# Patient Record
Sex: FEMALE | Race: WHITE | NOT HISPANIC OR LATINO | Employment: FULL TIME | URBAN - METROPOLITAN AREA
[De-identification: names, ages, dates, MRNs, and addresses within clinical notes are randomized per-mention and may not be internally consistent; named-entity substitution may affect disease eponyms.]

---

## 2018-12-18 ENCOUNTER — HISTORICAL (OUTPATIENT)
Dept: ADMINISTRATIVE | Facility: HOSPITAL | Age: 28
End: 2018-12-18

## 2018-12-18 LAB
ABS NEUT (OLG): 9.19 X10(3)/MCL (ref 2.1–9.2)
BASOPHILS # BLD AUTO: 0 X10(3)/MCL (ref 0–0.2)
BASOPHILS NFR BLD AUTO: 0 %
EOSINOPHIL # BLD AUTO: 0 X10(3)/MCL (ref 0–0.9)
EOSINOPHIL NFR BLD AUTO: 0 %
ERYTHROCYTE [DISTWIDTH] IN BLOOD BY AUTOMATED COUNT: 13.2 % (ref 11.5–17)
GLUCOSE 1H P 100 G GLC PO SERPL-MCNC: 114 MG/DL (ref 100–180)
GROUP & RH: NORMAL
HBV SURFACE AG SERPL QL IA: NEGATIVE
HCT VFR BLD AUTO: 34.8 % (ref 37–47)
HGB BLD-MCNC: 10.8 GM/DL (ref 12–16)
HIV 1+2 AB+HIV1 P24 AG SERPL QL IA: NEGATIVE
LYMPHOCYTES # BLD AUTO: 1.3 X10(3)/MCL (ref 0.6–4.6)
LYMPHOCYTES NFR BLD AUTO: 12 %
MCH RBC QN AUTO: 27.1 PG (ref 27–31)
MCHC RBC AUTO-ENTMCNC: 31 GM/DL (ref 33–36)
MCV RBC AUTO: 87.2 FL (ref 80–94)
MONOCYTES # BLD AUTO: 0.4 X10(3)/MCL (ref 0.1–1.3)
MONOCYTES NFR BLD AUTO: 3 %
NEUTROPHILS # BLD AUTO: 9.19 X10(3)/MCL (ref 2.1–9.2)
NEUTROPHILS NFR BLD AUTO: 84 %
PLATELET # BLD AUTO: 255 X10(3)/MCL (ref 130–400)
PMV BLD AUTO: 10.6 FL (ref 9.4–12.4)
RBC # BLD AUTO: 3.99 X10(6)/MCL (ref 4.2–5.4)
T PALLIDUM AB SER QL: NORMAL
WBC # SPEC AUTO: 11 X10(3)/MCL (ref 4.5–11.5)

## 2018-12-20 LAB — FINAL CULTURE: NORMAL

## 2019-02-14 ENCOUNTER — HISTORICAL (OUTPATIENT)
Dept: LAB | Facility: HOSPITAL | Age: 29
End: 2019-02-14

## 2019-02-16 LAB — FINAL CULTURE: NORMAL

## 2022-12-04 ENCOUNTER — HOSPITAL ENCOUNTER (EMERGENCY)
Facility: HOSPITAL | Age: 32
Discharge: HOME | End: 2022-12-04
Attending: EMERGENCY MEDICINE
Payer: MEDICAID

## 2022-12-04 VITALS
TEMPERATURE: 98 F | BODY MASS INDEX: 25.9 KG/M2 | HEART RATE: 75 BPM | DIASTOLIC BLOOD PRESSURE: 78 MMHG | OXYGEN SATURATION: 99 % | WEIGHT: 165 LBS | SYSTOLIC BLOOD PRESSURE: 134 MMHG | RESPIRATION RATE: 18 BRPM | HEIGHT: 67 IN

## 2022-12-04 DIAGNOSIS — S83.004A DISLOCATION OF RIGHT PATELLA, INITIAL ENCOUNTER: Primary | ICD-10-CM

## 2022-12-04 DIAGNOSIS — M25.562 LEFT KNEE PAIN: ICD-10-CM

## 2022-12-04 PROCEDURE — 99284 EMERGENCY DEPT VISIT MOD MDM: CPT | Mod: 25

## 2022-12-04 PROCEDURE — 96372 THER/PROPH/DIAG INJ SC/IM: CPT | Performed by: EMERGENCY MEDICINE

## 2022-12-04 PROCEDURE — 25000003 PHARM REV CODE 250: Performed by: EMERGENCY MEDICINE

## 2022-12-04 PROCEDURE — 63600175 PHARM REV CODE 636 W HCPCS: Performed by: EMERGENCY MEDICINE

## 2022-12-04 RX ORDER — HYDROCODONE BITARTRATE AND ACETAMINOPHEN 5; 325 MG/1; MG/1
1 TABLET ORAL
Status: COMPLETED | OUTPATIENT
Start: 2022-12-04 | End: 2022-12-04

## 2022-12-04 RX ORDER — IBUPROFEN 800 MG/1
800 TABLET ORAL EVERY 6 HOURS PRN
Qty: 20 TABLET | Refills: 0 | Status: SHIPPED | OUTPATIENT
Start: 2022-12-04 | End: 2023-02-15 | Stop reason: HOSPADM

## 2022-12-04 RX ORDER — KETOROLAC TROMETHAMINE 30 MG/ML
30 INJECTION, SOLUTION INTRAMUSCULAR; INTRAVENOUS
Status: COMPLETED | OUTPATIENT
Start: 2022-12-04 | End: 2022-12-04

## 2022-12-04 RX ORDER — HYDROCODONE BITARTRATE AND ACETAMINOPHEN 5; 325 MG/1; MG/1
1 TABLET ORAL EVERY 6 HOURS PRN
Qty: 10 TABLET | Refills: 0 | Status: SHIPPED | OUTPATIENT
Start: 2022-12-04 | End: 2023-02-15 | Stop reason: HOSPADM

## 2022-12-04 RX ADMIN — KETOROLAC TROMETHAMINE 30 MG: 30 INJECTION, SOLUTION INTRAMUSCULAR at 11:12

## 2022-12-04 RX ADMIN — HYDROCODONE BITARTRATE AND ACETAMINOPHEN 1 TABLET: 5; 325 TABLET ORAL at 11:12

## 2022-12-04 NOTE — FIRST PROVIDER EVALUATION
"Medical screening examination initiated.  I have conducted a focused provider triage encounter, findings are as follows:    Brief history of present illness:  32-year-old female presents to ED for evaluation of left knee pain.  Patient states she was dancing in [employer] when she felt her knee dislocate.  Per EMS given fentanyl and were able to relocate her knee.    Vitals:    12/04/22 1005   BP: (!) 141/84   BP Location: Left arm   Pulse: 71   Resp: 19   Temp: 97.2 °F (36.2 °C)   TempSrc: Oral   SpO2: 99%   Weight: 74.8 kg (165 lb)   Height: 5' 7" (1.702 m)       Pertinent physical exam:  Patient awake and alert lying on stretcher    Brief workup plan:  X-ray left knee    Preliminary workup initiated; this workup will be continued and followed by the physician or advanced practice provider that is assigned to the patient when roomed.  "

## 2022-12-04 NOTE — Clinical Note
"Teresa"Shar Simpson was seen and treated in our emergency department on 12/4/2022.  She may return to work on 12/12/2022.       If you have any questions or concerns, please don't hesitate to call.      Bart NAVARRETE RN    "

## 2022-12-04 NOTE — ED PROVIDER NOTES
"Encounter Date: 12/4/2022    SCRIBE #1 NOTE: I, Lesvia Stock, am scribing for, and in the presence of,  Marialuisa Childress MD. I have scribed the following portions of the note - Other sections scribed: HPI, ROS, Physical exam.     History     Chief Complaint   Patient presents with    Knee Pain     Pt reports left knee pain while dancing at [employer]. EMS reports administering 100 mcg Fentanyl IV. CMS intact to left lower extremity. Pt GCS-15.       This is a 32 y.o. female who presents with complaint of left knee pain with onset prior to arrival. Patient reports that she was dancing at [employer] when she felt her knee move to the left. She adds that she fell backwards and that was when the knee pain began. Patient notes that she could not straighten her knee until EMS "popped it back in place." Patient rates that pain a 3/10 currently. She states that the pain fluctuates.      The history is provided by the patient.   Knee Pain  This is a new problem. The current episode started 1 to 2 hours ago. Episode frequency: intermittenly. The problem has been gradually improving. Pertinent negatives include no chest pain, no abdominal pain, no headaches and no shortness of breath.   Review of patient's allergies indicates:  No Known Allergies  History reviewed. No pertinent past medical history.  History reviewed. No pertinent surgical history.  History reviewed. No pertinent family history.     Review of Systems   Constitutional:  Negative for chills, fatigue and fever.   HENT:  Negative for congestion and sore throat.    Eyes:  Negative for visual disturbance.   Respiratory:  Negative for cough and shortness of breath.    Cardiovascular:  Negative for chest pain.   Gastrointestinal:  Negative for abdominal pain, diarrhea, nausea and vomiting.   Genitourinary:  Negative for dysuria.   Musculoskeletal:  Negative for myalgias.        + Left knee pain   Skin:  Negative for rash.   Neurological:  Negative for weakness, numbness and " headaches.   All other systems reviewed and are negative.    Physical Exam     Initial Vitals [12/04/22 1005]   BP Pulse Resp Temp SpO2   (!) 141/84 71 19 97.2 °F (36.2 °C) 99 %      MAP       --         Physical Exam    Nursing note and vitals reviewed.  Constitutional: She appears well-developed and well-nourished. She is not diaphoretic. No distress.   HENT:   Head: Normocephalic and atraumatic.   Nose: Nose normal.   Mouth/Throat: Oropharynx is clear and moist.   Eyes: Conjunctivae and EOM are normal. Pupils are equal, round, and reactive to light.   Neck: Trachea normal. Neck supple.   Normal range of motion.  Cardiovascular:  Normal rate, regular rhythm, normal heart sounds and intact distal pulses.           No murmur heard.  Pulmonary/Chest: Breath sounds normal. No respiratory distress. She has no wheezes. She has no rhonchi. She has no rales. She exhibits no tenderness.   Abdominal: Abdomen is soft. Bowel sounds are normal. She exhibits no distension and no mass. There is no abdominal tenderness. There is no rebound and no guarding.   Musculoskeletal:         General: No tenderness.      Cervical back: Normal range of motion and neck supple.      Lumbar back: Normal. Normal range of motion.      Comments: Soft tissue swelling around patella, but patella is in place. No apparent effusion. Good pulses.     Neurological: She is alert and oriented to person, place, and time. She has normal strength. No cranial nerve deficit or sensory deficit.   Skin: Skin is warm and dry. Capillary refill takes less than 2 seconds. No abscess noted. No erythema. No pallor.   Psychiatric: She has a normal mood and affect. Her behavior is normal. Judgment and thought content normal.       ED Course   Procedures  Labs Reviewed - No data to display       Imaging Results              X-Ray Knee 3 View Left (Final result)  Result time 12/04/22 10:32:28      Final result by Yash Pompa MD (12/04/22 10:32:28)                    Impression:      No acute fracture.      Electronically signed by: Yash Pompa  Date:    12/04/2022  Time:    10:32               Narrative:    EXAMINATION:  XR KNEE 3 VIEW LEFT    CLINICAL HISTORY:  Pain in left knee    TECHNIQUE:  AP, lateral, and Merchant views of the left knee were performed.    COMPARISON:  None    FINDINGS:  No displaced fracture the soft tissues are grossly unremarkable.                                    X-Rays:   Independently Interpreted Readings:   Other Readings:  Knee xr without acute fracture, patella appears in place  Medications   ketorolac injection 30 mg (has no administration in time range)   HYDROcodone-acetaminophen 5-325 mg per tablet 1 tablet (has no administration in time range)     Medical Decision Making:   History:   Old Medical Records: I decided to obtain old medical records.  Old Records Summarized: records from clinic visits.       <> Summary of Records: noncontributory  Initial Assessment:   Patellar dislocation, reduced  Differential Diagnosis:   Tibial plateau fracture, incomplete reduction  Independently Interpreted Test(s):   I have ordered and independently interpreted X-rays - see prior notes.  Clinical Tests:   Radiological Study: Ordered and Reviewed  ED Management:  Knee immobilizer, pain control, ortho f/u        Scribe Attestation:   Scribe #1: I performed the above scribed service and the documentation accurately describes the services I performed. I attest to the accuracy of the note.  Comments: Attending:   Physician Attestation Statement for Scribe #1: I, Marialuisa Childress MD, personally performed the services described in this documentation. All medical record entries made by the scribe were at my direction and in my presence.  I have reviewed the chart and agree that the record reflects my personal performance and is accurate and complete.        Attending Attestation:           Physician Attestation for Scribe:  Physician Attestation Statement  for Scribe #1: I, Marialuisa Childress MD, reviewed documentation, as scribed by Lesvia Stock in my presence, and it is both accurate and complete.                        Clinical Impression:   Final diagnoses:  [M25.562] Left knee pain  [S83.004A] Dislocation of right patella, initial encounter (Primary)        ED Disposition Condition    Discharge Stable          ED Prescriptions       Medication Sig Dispense Start Date End Date Auth. Provider    HYDROcodone-acetaminophen (NORCO) 5-325 mg per tablet Take 1 tablet by mouth every 6 (six) hours as needed for Pain. 10 tablet 12/4/2022 -- Marialuisa Childress MD    ibuprofen (ADVIL,MOTRIN) 800 MG tablet Take 1 tablet (800 mg total) by mouth every 6 (six) hours as needed for Pain (take wtih food). 20 tablet 12/4/2022 -- Marialuisa Childress MD          Follow-up Information       Follow up With Specialties Details Why Contact Info    See Alvarez, DO Orthopedic Surgery Call in 1 day  [address]  [address]  [facility] [address]  [phone]      [facility] - Emergency Dept Emergency Medicine  As needed, If symptoms worsen [address] [address] [phone]             Marialuisa Childress MD  12/04/22 120

## 2022-12-06 DIAGNOSIS — M25.562 LEFT KNEE PAIN, UNSPECIFIED CHRONICITY: Primary | ICD-10-CM

## 2022-12-06 DIAGNOSIS — M25.562 LEFT KNEE PAIN: ICD-10-CM

## 2022-12-19 ENCOUNTER — HOSPITAL ENCOUNTER (OUTPATIENT)
Dept: RADIOLOGY | Facility: HOSPITAL | Age: 32
Discharge: HOME | End: 2022-12-19
Attending: EMERGENCY MEDICINE
Payer: MEDICAID

## 2022-12-19 DIAGNOSIS — M25.562 LEFT KNEE PAIN, UNSPECIFIED CHRONICITY: ICD-10-CM

## 2022-12-19 DIAGNOSIS — M25.562 LEFT KNEE PAIN: Primary | ICD-10-CM

## 2022-12-19 DIAGNOSIS — M25.562 LEFT KNEE PAIN: ICD-10-CM

## 2022-12-19 PROCEDURE — 73562 X-RAY EXAM OF KNEE 3: CPT | Mod: TC,LT

## 2022-12-19 PROCEDURE — 73565 X-RAY EXAM OF KNEES: CPT | Mod: TC

## 2023-01-18 ENCOUNTER — OFFICE VISIT (OUTPATIENT)
Dept: ORTHOPEDICS | Facility: CLINIC | Age: 33
End: 2023-01-18
Payer: MEDICAID

## 2023-01-18 ENCOUNTER — HOSPITAL ENCOUNTER (OUTPATIENT)
Dept: RADIOLOGY | Facility: HOSPITAL | Age: 33
Discharge: HOME | End: 2023-01-18
Attending: STUDENT IN AN ORGANIZED HEALTH CARE EDUCATION/TRAINING PROGRAM
Payer: MEDICAID

## 2023-01-18 VITALS
HEART RATE: 67 BPM | BODY MASS INDEX: 32.27 KG/M2 | SYSTOLIC BLOOD PRESSURE: 133 MMHG | WEIGHT: 205.63 LBS | DIASTOLIC BLOOD PRESSURE: 87 MMHG | HEIGHT: 67 IN | RESPIRATION RATE: 18 BRPM | OXYGEN SATURATION: 99 %

## 2023-01-18 DIAGNOSIS — M25.362 PATELLAR INSTABILITY OF LEFT KNEE: ICD-10-CM

## 2023-01-18 DIAGNOSIS — M25.562 ACUTE PAIN OF LEFT KNEE: Primary | ICD-10-CM

## 2023-01-18 DIAGNOSIS — M25.562 LEFT KNEE PAIN, UNSPECIFIED CHRONICITY: ICD-10-CM

## 2023-01-18 DIAGNOSIS — M25.562 ACUTE PAIN OF LEFT KNEE: ICD-10-CM

## 2023-01-18 PROCEDURE — 3079F DIAST BP 80-89 MM HG: CPT | Mod: CPTII,,, | Performed by: SPECIALIST

## 2023-01-18 PROCEDURE — 99214 OFFICE O/P EST MOD 30 MIN: CPT | Mod: PBBFAC

## 2023-01-18 PROCEDURE — 3075F SYST BP GE 130 - 139MM HG: CPT | Mod: CPTII,,, | Performed by: SPECIALIST

## 2023-01-18 PROCEDURE — 99203 PR OFFICE/OUTPT VISIT, NEW, LEVL III, 30-44 MIN: ICD-10-PCS | Mod: S$PBB,,, | Performed by: SPECIALIST

## 2023-01-18 PROCEDURE — 73564 X-RAY EXAM KNEE 4 OR MORE: CPT | Mod: TC,LT

## 2023-01-18 PROCEDURE — 3008F PR BODY MASS INDEX (BMI) DOCUMENTED: ICD-10-PCS | Mod: CPTII,,, | Performed by: SPECIALIST

## 2023-01-18 PROCEDURE — 3079F PR MOST RECENT DIASTOLIC BLOOD PRESSURE 80-89 MM HG: ICD-10-PCS | Mod: CPTII,,, | Performed by: SPECIALIST

## 2023-01-18 PROCEDURE — 1159F MED LIST DOCD IN RCRD: CPT | Mod: CPTII,,, | Performed by: SPECIALIST

## 2023-01-18 PROCEDURE — 1159F PR MEDICATION LIST DOCUMENTED IN MEDICAL RECORD: ICD-10-PCS | Mod: CPTII,,, | Performed by: SPECIALIST

## 2023-01-18 PROCEDURE — 3008F BODY MASS INDEX DOCD: CPT | Mod: CPTII,,, | Performed by: SPECIALIST

## 2023-01-18 PROCEDURE — 3075F PR MOST RECENT SYSTOLIC BLOOD PRESS GE 130-139MM HG: ICD-10-PCS | Mod: CPTII,,, | Performed by: SPECIALIST

## 2023-01-18 PROCEDURE — 99203 OFFICE O/P NEW LOW 30 MIN: CPT | Mod: S$PBB,,, | Performed by: SPECIALIST

## 2023-01-18 NOTE — PROGRESS NOTES
[facility] Orthopaedic Surgery Clinic Note    In brief, Teresa Simpson is a 32 y.o. female who sustained a left patellar dislocation while dancing in early December, she states that EMS was able to reduce the patella dislocation, and she has been able to walk in a brace since this time.  She denies previous injuries to the left or right knee.  Has never had any instability issues with either knee.  She does state that she feels she can not trust the knee, however she has not had any dislocations since her previous injury in December.  She does have mild pain over the medial femoral condyle.  She denies any paresthesias distally states that her pain occasionally does radiate into the mid quad and mid lower leg.      PMH:   Past Medical History:   Diagnosis Date    Known health problems: none        PSH:   Past Surgical History:   Procedure Laterality Date    OVARIAN CYST REMOVAL N/A        SH:   Social History     Socioeconomic History    Marital status:    Tobacco Use    Smoking status: Never    Smokeless tobacco: Never   Substance and Sexual Activity    Alcohol use: Yes    Drug use: Never       FH: History reviewed. No pertinent family history.    Allergies: Review of patient's allergies indicates:  No Known Allergies    ROS:  Negative except for above    Physical Exam:  Vitals:    01/18/23 0743   BP: 133/87   Pulse: 67   Resp: 18       General: NAD  Cardio: RRR by peripheral pulse  Pulm: Normal WOB on room air, symmetric chest rise    MSK:  Left lower extremity   Skin intact, no open wounds  Moderate effusion  Minimal tenderness to palpation over medial femoral condyle and medial proximal patella pole  Some apprehension with lateral translation of the patella  Patellar grind negative  lateral translation patella 3/4  J sign positive   Negative Lachman, negative posterior drawer  Knee stable to varus and valgus stress  Range of motion 0-130, comparable to contralateral side  Sensation intact to light  touch SA/S/SP/DP/T   Motor intact quad/hamstrings/TA/EHL/FHL  Quadriceps 4/5 strength  DP palpable, skin warm and well-perfused    Imaging:   X-rays of the left knee demonstrate patella aligned within trochlear groove, no obvious fractures of the patella, femur, tibia, medial and lateral joint spaces maintained    Assessment:  32-year-old female with first-time dislocation of the left patella, sustained while dancing in December.  No formal treatment yet, however she has seen a group in [place] who reportedly scheduled her for a left knee MRI.    -we discussed that since this is her 1st dislocation and she has not had any subsequent dislocations we would trial a period of formal physical therapy for quad strengthening specifically focusing on the VMO and maintaining range of motion and keep her in a knee brace with medial and lateral struts, she is in agreement with this plan  -Return to clinic in 8 weeks following a full course of therapy, if she continues to have instability episodes are has a repeat dislocation she may return sooner and we will obtain an MRI to evaluate the need for MPFL reconstruction and left knee arthroscopy      Roney Martins MD  [facility] Orthopaedic Surgery  1/18/2023 8:23 AM

## 2023-01-18 NOTE — PROGRESS NOTES
Faculty Attestation: Teresa Simpson  was seen at [facility] in the Orthopaedic Clinic. Discussed with the resident at the time of the visit. History of Present Illness, Physical Exam, and Assessment and Plan reviewed. Treatment plan is reasonable and appropriate. Compliance with treatment recommendations is important. No procedure was performed.     Ramon Guerrier MD  Orthopaedic Surgery

## 2023-02-13 ENCOUNTER — OFFICE VISIT (OUTPATIENT)
Dept: ORTHOPEDICS | Facility: CLINIC | Age: 33
End: 2023-02-13
Payer: MEDICAID

## 2023-02-13 VITALS
WEIGHT: 205 LBS | HEIGHT: 67 IN | BODY MASS INDEX: 32.18 KG/M2 | HEART RATE: 87 BPM | SYSTOLIC BLOOD PRESSURE: 128 MMHG | DIASTOLIC BLOOD PRESSURE: 77 MMHG | RESPIRATION RATE: 20 BRPM

## 2023-02-13 DIAGNOSIS — M25.362 PATELLAR INSTABILITY OF LEFT KNEE: Primary | ICD-10-CM

## 2023-02-13 DIAGNOSIS — M23.42 LOOSE BODY IN KNEE, LEFT: ICD-10-CM

## 2023-02-13 PROCEDURE — 3078F DIAST BP <80 MM HG: CPT | Mod: CPTII,,, | Performed by: ORTHOPAEDIC SURGERY

## 2023-02-13 PROCEDURE — 1159F PR MEDICATION LIST DOCUMENTED IN MEDICAL RECORD: ICD-10-PCS | Mod: CPTII,,, | Performed by: ORTHOPAEDIC SURGERY

## 2023-02-13 PROCEDURE — 99213 OFFICE O/P EST LOW 20 MIN: CPT | Mod: PBBFAC

## 2023-02-13 PROCEDURE — 3008F PR BODY MASS INDEX (BMI) DOCUMENTED: ICD-10-PCS | Mod: CPTII,,, | Performed by: ORTHOPAEDIC SURGERY

## 2023-02-13 PROCEDURE — 3074F PR MOST RECENT SYSTOLIC BLOOD PRESSURE < 130 MM HG: ICD-10-PCS | Mod: CPTII,,, | Performed by: ORTHOPAEDIC SURGERY

## 2023-02-13 PROCEDURE — 1159F MED LIST DOCD IN RCRD: CPT | Mod: CPTII,,, | Performed by: ORTHOPAEDIC SURGERY

## 2023-02-13 PROCEDURE — 99214 PR OFFICE/OUTPT VISIT, EST, LEVL IV, 30-39 MIN: ICD-10-PCS | Mod: S$PBB,,, | Performed by: ORTHOPAEDIC SURGERY

## 2023-02-13 PROCEDURE — 3008F BODY MASS INDEX DOCD: CPT | Mod: CPTII,,, | Performed by: ORTHOPAEDIC SURGERY

## 2023-02-13 PROCEDURE — 3078F PR MOST RECENT DIASTOLIC BLOOD PRESSURE < 80 MM HG: ICD-10-PCS | Mod: CPTII,,, | Performed by: ORTHOPAEDIC SURGERY

## 2023-02-13 PROCEDURE — 99214 OFFICE O/P EST MOD 30 MIN: CPT | Mod: S$PBB,,, | Performed by: ORTHOPAEDIC SURGERY

## 2023-02-13 PROCEDURE — 3074F SYST BP LT 130 MM HG: CPT | Mod: CPTII,,, | Performed by: ORTHOPAEDIC SURGERY

## 2023-02-13 RX ORDER — MUPIROCIN 20 MG/G
OINTMENT TOPICAL
Status: CANCELLED | OUTPATIENT
Start: 2023-02-13

## 2023-02-14 NOTE — PROGRESS NOTES
[facility] Orthopaedic Surgery Clinic Note    In brief, Teresa Simpson is a 32 y.o. female who sustained a left patellar dislocation while dancing in early December, she states that EMS was able to reduce the patella dislocation, and she has been able to walk in a brace since this time.  She denies previous injuries to the left or right knee.  She does state that she feels she can not trust the knee.     She does still have mild pain over the medial femoral condyle and occasional swelling.  She denies any paresthesias distally states that her pain occasionally does radiate into the mid quad and mid lower leg.  She was previously being treated by an orthopedic surgeon in [place] with therapy and home exercises, but still has pain and residual instability.    PMH:   Past Medical History:   Diagnosis Date    Known health problems: none        PSH:   Past Surgical History:   Procedure Laterality Date    OVARIAN CYST REMOVAL N/A        SH:   Social History     Socioeconomic History    Marital status:    Tobacco Use    Smoking status: Never    Smokeless tobacco: Never   Substance and Sexual Activity    Alcohol use: Yes    Drug use: Never       FH: History reviewed. No pertinent family history.    Allergies: Review of patient's allergies indicates:  No Known Allergies    ROS:  Negative except for above    Physical Exam:  Vitals:    02/13/23 1219   BP: 128/77   Pulse: 87   Resp: 20       General: NAD  Cardio: RRR by peripheral pulse  Pulm: Normal WOB on room air, symmetric chest rise    MSK:  Left lower extremity   Skin intact, no open wounds  Moderate effusion  TTP over medial femoral condyle and medial proximal patella pole  Some apprehension with lateral translation of the patella  Patellar grind negative  lateral translation patella 3/4  J sign positive   Negative Lachman, negative posterior drawer  Knee stable to varus and valgus stress  Range of motion 0-130, comparable to contralateral  side  Sensation intact to light touch SA/S/SP/DP/T   Motor intact quad/hamstrings/TA/EHL/FHL  Quadriceps 4/5 strength  DP palpable, skin warm and well-perfused    Imaging:   MRI of the left knee demonstrates full thickness MPFL tear from medial femoral condyle, as well as a small osteochondral defect from the medial patella facet with loose intraarticular body    Assessment:  32-year-old female with lateral dislocation of the left patella, sustained while dancing in December. Currently feels unstable, has been wearing hinged knee brace. Failed home exercise program. Has full ROM. Interested in surgery.     -discussed the risks, benefits and alternatives to MPFL recon with arthroscopic loose body removal, she understands these and wishes to proceed  -consent obtained, case booked for 2/16/23      Roney Martins MD

## 2023-02-15 ENCOUNTER — PATIENT MESSAGE (OUTPATIENT)
Dept: ADMINISTRATIVE | Facility: OTHER | Age: 33
End: 2023-02-15
Payer: MEDICAID

## 2023-02-15 ENCOUNTER — ANESTHESIA EVENT (OUTPATIENT)
Dept: SURGERY | Facility: HOSPITAL | Age: 33
End: 2023-02-15
Payer: MEDICAID

## 2023-02-15 RX ORDER — HYDROCODONE BITARTRATE AND ACETAMINOPHEN 10; 325 MG/1; MG/1
1 TABLET ORAL EVERY 6 HOURS PRN
Qty: 20 TABLET | Refills: 0 | Status: SHIPPED | OUTPATIENT
Start: 2023-02-15

## 2023-02-15 RX ORDER — KETOROLAC TROMETHAMINE 10 MG/1
10 TABLET, FILM COATED ORAL EVERY 6 HOURS
Qty: 20 TABLET | Refills: 0 | Status: SHIPPED | OUTPATIENT
Start: 2023-02-15 | End: 2023-02-20

## 2023-02-15 RX ORDER — PREDNISONE 20 MG/1
20 TABLET ORAL
COMMUNITY
Start: 2023-02-12

## 2023-02-15 RX ORDER — CHLOPHEDIANOL HCL AND PYRILAMINE MALEATE 12.5; 12.5 MG/5ML; MG/5ML
SOLUTION ORAL
COMMUNITY
Start: 2023-02-12

## 2023-02-15 RX ORDER — CEPHALEXIN 500 MG/1
500 CAPSULE ORAL 2 TIMES DAILY
COMMUNITY
Start: 2023-02-12

## 2023-02-15 RX ORDER — METHOCARBAMOL 500 MG/1
500 TABLET, FILM COATED ORAL 4 TIMES DAILY
Qty: 56 TABLET | Refills: 0 | Status: SHIPPED | OUTPATIENT
Start: 2023-02-15 | End: 2023-03-01

## 2023-02-15 RX ORDER — GABAPENTIN 300 MG/1
300 CAPSULE ORAL 3 TIMES DAILY
Qty: 42 CAPSULE | Refills: 0 | Status: SHIPPED | OUTPATIENT
Start: 2023-02-15 | End: 2023-03-01

## 2023-02-15 RX ORDER — ONDANSETRON 4 MG/1
4 TABLET, FILM COATED ORAL 2 TIMES DAILY
Qty: 28 TABLET | Refills: 0 | Status: SHIPPED | OUTPATIENT
Start: 2023-02-15 | End: 2023-03-01

## 2023-02-15 NOTE — PROGRESS NOTES
Patient states she has Strep throat which was diagnosed on 2/12/2023 and is currently on antibiotics and Steroids. Informed Dr. Harris states she should be fine but will evaluate patient in Am   No am meds for DOS  Patoient no longer on Norco

## 2023-02-15 NOTE — PROGRESS NOTES
Faculty Attestation  Preop Dx:  Left patellar dislocation with MPFL disruption  Plan:  Medial patellofemoral ligament reconstruction  I agree with the resident's History & Physical.  Proceed with case.    Jey Zambrano  Orthopaedic Surgery

## 2023-02-15 NOTE — H&P
Orthopedic Surgery Interval H&P    Patients history and exam have been reviewed. There are no changes from previous H&P documented below. Plan for OR today with Dr. Zambrano for MPFL recon left knee, loose body removal, diagnostic arthroscopy.  Discharge home following surgery    Roney Martins MD  [facility] Orthopedic Surgery    [facility] Orthopaedic Surgery Clinic Note    In brief, Teresa Simpson is a 32 y.o. female who sustained a left patellar dislocation while dancing in early December, she states that EMS was able to reduce the patella dislocation, and she has been able to walk in a brace since this time.  She denies previous injuries to the left or right knee.  She does state that she feels she can not trust the knee.     She does still have mild pain over the medial femoral condyle and occasional swelling.  She denies any paresthesias distally states that her pain occasionally does radiate into the mid quad and mid lower leg.  She was previously being treated by an orthopedic surgeon in [place] with therapy and home exercises, but still has pain and residual instability.    PMH:   Past Medical History:   Diagnosis Date    Known health problems: none        PSH:   Past Surgical History:   Procedure Laterality Date    OVARIAN CYST REMOVAL N/A        SH:   Social History     Socioeconomic History    Marital status:    Tobacco Use    Smoking status: Never    Smokeless tobacco: Never   Substance and Sexual Activity    Alcohol use: Yes    Drug use: Never       FH: No family history on file.    Allergies: Review of patient's allergies indicates:  No Known Allergies    ROS:  Negative except for above    Physical Exam:  There were no vitals filed for this visit.      General: NAD  Cardio: RRR by peripheral pulse  Pulm: Normal WOB on room air, symmetric chest rise    MSK:  Left lower extremity   Skin intact, no open wounds  Moderate effusion  TTP over medial femoral condyle and medial proximal patella  pole  Some apprehension with lateral translation of the patella  Patellar grind negative  lateral translation patella 3/4  J sign positive   Negative Lachman, negative posterior drawer  Knee stable to varus and valgus stress  Range of motion 0-130, comparable to contralateral side  Sensation intact to light touch SA/S/SP/DP/T   Motor intact quad/hamstrings/TA/EHL/FHL  Quadriceps 4/5 strength  DP palpable, skin warm and well-perfused    Imaging:   MRI of the left knee demonstrates full thickness MPFL tear from medial femoral condyle, as well as a small osteochondral defect from the medial patella facet with loose intraarticular body    Assessment:  32-year-old female with lateral dislocation of the left patella, sustained while dancing in December. Currently feels unstable, has been wearing hinged knee brace. Failed home exercise program. Has full ROM. Interested in surgery.     -discussed the risks, benefits and alternatives to MPFL recon with arthroscopic loose body removal, she understands these and wishes to proceed  -consent obtained, case booked for 2/16/23      Roney Martins MD

## 2023-02-15 NOTE — ANESTHESIA PREPROCEDURE EVALUATION
"                                                                                                             02/15/2023  Teresa Simpson is a 32 y.o., female.    COVID STATUS: J&J x 1 (7/10/21)  BETA-BLOCKER: NONE    PAT NURSE PHONE INTERVIEW 2/15/23    PROBLEM LIST:  -  LEFT PATELLA INSTABILITY, LOOSE BODY  -  OBESITY CLASS I  -  UPT STATUS -- NEG DOS   -  UCC 2/12/23 Dx w/STREP; Rx'D KELFEX X 10 DAYS, PREDNISONE X 5 DAYS, & NINJACOF  -  ER @ [facility] 2/14/22 w/GENERALIZED BODY ACHES; Rx'd ZOFRAN.  -  ETOH "OCASSIONALLY"      Vitals:    02/16/23 0511 02/16/23 0515 02/16/23 0528 02/16/23 0637   BP: (!) 160/91 (!) 151/96  (!) 140/72   BP Location: Right arm Left arm     Patient Position: Sitting Sitting     Pulse: 89   82   Resp:    20   Temp: 36.8 °C (98.2 °F)   36.3 °C (97.3 °F)   TempSrc: Oral   Temporal   SpO2: 96%   100%   Weight:   93 kg (205 lb 0.4 oz)        Lab Results   Component Value Date    WBC 9.5 03/13/2019    HGB 11.1 (L) 03/13/2019    HCT 34.5 (L) 03/13/2019     03/13/2019         AM Rx DOS: NONE    ORDERS -   SURGEON: 3/13/19 CBC  ANESTHESIA: UPT    Pre-op Assessment    I have reviewed the Patient Summary Reports.     I have reviewed the Nursing Notes. I have reviewed the NPO Status.   I have reviewed the Medications.     Review of Systems  Anesthesia Hx:  No problems with previous Anesthesia  History of prior surgery of interest to airway management or planning: Denies Family Hx of Anesthesia complications.   Denies Personal Hx of Anesthesia complications.   Hematology/Oncology:  Hematology Normal   Oncology Normal     EENT/Dental:EENT/Dental Normal   Cardiovascular:  Cardiovascular Normal     Pulmonary:  Pulmonary Normal    Renal/:  Renal/ Normal     Hepatic/GI:  Hepatic/GI Normal    Musculoskeletal:  Musculoskeletal Normal    Neurological:  Neurology Normal    Endocrine:  Endocrine Normal    Dermatological:  Skin Normal    Psych:  Psychiatric Normal           Physical " Exam  General: Well nourished, Cooperative, Alert and Oriented    Airway:  Mallampati: I / I  Mouth Opening: Normal  TM Distance: Normal  Tongue: Large    Dental:  Intact    Chest/Lungs:  expiratory wheezes        Anesthesia Plan  Type of Anesthesia, risks & benefits discussed:    Anesthesia Type: Gen Supraglottic Airway, Regional  Intra-op Monitoring Plan: Standard ASA Monitors  Post Op Pain Control Plan: multimodal analgesia, peripheral nerve block and IV/PO Opioids PRN  Induction:  IV  Airway Plan: Direct  Informed Consent: Informed consent signed with the Patient and all parties understand the risks and agree with anesthesia plan.  All questions answered. Patient consented to blood products? No  ASA Score: 2  Day of Surgery Review of History & Physical: H&P Update referred to the surgeon/provider.    Ready For Surgery From Anesthesia Perspective.     .    Lab Results   Component Value Date    WBC 9.5 03/13/2019    HGB 11.1 (L) 03/13/2019    HCT 34.5 (L) 03/13/2019    MCV 83.1 03/13/2019     03/13/2019

## 2023-02-16 ENCOUNTER — ANESTHESIA (OUTPATIENT)
Dept: SURGERY | Facility: HOSPITAL | Age: 33
End: 2023-02-16
Payer: MEDICAID

## 2023-02-16 ENCOUNTER — HOSPITAL ENCOUNTER (OUTPATIENT)
Facility: HOSPITAL | Age: 33
Discharge: HOME | End: 2023-02-16
Attending: ORTHOPAEDIC SURGERY | Admitting: ORTHOPAEDIC SURGERY
Payer: MEDICAID

## 2023-02-16 DIAGNOSIS — M25.362 PATELLAR INSTABILITY OF LEFT KNEE: ICD-10-CM

## 2023-02-16 DIAGNOSIS — M23.42 LOOSE BODY IN KNEE, LEFT: ICD-10-CM

## 2023-02-16 LAB
B-HCG UR QL: NEGATIVE
CTP QC/QA: YES

## 2023-02-16 PROCEDURE — 37000008 HC ANESTHESIA 1ST 15 MINUTES: Performed by: ORTHOPAEDIC SURGERY

## 2023-02-16 PROCEDURE — 36000710: Performed by: ORTHOPAEDIC SURGERY

## 2023-02-16 PROCEDURE — 01320 ANES ALL PX NRV MSC KNE&/POP: CPT | Performed by: ORTHOPAEDIC SURGERY

## 2023-02-16 PROCEDURE — C1762 CONN TISS, HUMAN(INC FASCIA): HCPCS | Performed by: ORTHOPAEDIC SURGERY

## 2023-02-16 PROCEDURE — 81025 URINE PREGNANCY TEST: CPT | Performed by: NURSE PRACTITIONER

## 2023-02-16 PROCEDURE — 37000009 HC ANESTHESIA EA ADD 15 MINS: Performed by: ORTHOPAEDIC SURGERY

## 2023-02-16 PROCEDURE — 25000003 PHARM REV CODE 250: Performed by: NURSE ANESTHETIST, CERTIFIED REGISTERED

## 2023-02-16 PROCEDURE — 71000015 HC POSTOP RECOV 1ST HR: Performed by: ORTHOPAEDIC SURGERY

## 2023-02-16 PROCEDURE — 63600175 PHARM REV CODE 636 W HCPCS: Performed by: SPECIALIST

## 2023-02-16 PROCEDURE — C1713 ANCHOR/SCREW BN/BN,TIS/BN: HCPCS | Performed by: ORTHOPAEDIC SURGERY

## 2023-02-16 PROCEDURE — 71000016 HC POSTOP RECOV ADDL HR: Performed by: ORTHOPAEDIC SURGERY

## 2023-02-16 PROCEDURE — 27427 PR LIGMT REVISION,KNEE,EXTRA-ARTIC: ICD-10-PCS | Mod: LT,,, | Performed by: ORTHOPAEDIC SURGERY

## 2023-02-16 PROCEDURE — 36000711: Performed by: ORTHOPAEDIC SURGERY

## 2023-02-16 PROCEDURE — 27201423 OPTIME MED/SURG SUP & DEVICES STERILE SUPPLY: Performed by: ORTHOPAEDIC SURGERY

## 2023-02-16 PROCEDURE — 63600175 PHARM REV CODE 636 W HCPCS: Performed by: STUDENT IN AN ORGANIZED HEALTH CARE EDUCATION/TRAINING PROGRAM

## 2023-02-16 PROCEDURE — C1889 IMPLANT/INSERT DEVICE, NOC: HCPCS | Performed by: ORTHOPAEDIC SURGERY

## 2023-02-16 PROCEDURE — 63600175 PHARM REV CODE 636 W HCPCS

## 2023-02-16 PROCEDURE — 71000033 HC RECOVERY, INTIAL HOUR: Performed by: ORTHOPAEDIC SURGERY

## 2023-02-16 PROCEDURE — 63600175 PHARM REV CODE 636 W HCPCS: Performed by: NURSE ANESTHETIST, CERTIFIED REGISTERED

## 2023-02-16 PROCEDURE — 27427 RECONSTRUCTION KNEE: CPT | Mod: LT,,, | Performed by: ORTHOPAEDIC SURGERY

## 2023-02-16 PROCEDURE — 25000003 PHARM REV CODE 250: Performed by: ORTHOPAEDIC SURGERY

## 2023-02-16 DEVICE — IMPLANTABLE DEVICE: Type: IMPLANTABLE DEVICE | Site: KNEE | Status: FUNCTIONAL

## 2023-02-16 DEVICE — Ø6X 20MM BC IF SCRW, VENTED
Type: IMPLANTABLE DEVICE | Site: KNEE | Status: FUNCTIONAL
Brand: ARTHREX®

## 2023-02-16 RX ORDER — MEPERIDINE HYDROCHLORIDE 25 MG/ML
12.5 INJECTION INTRAMUSCULAR; INTRAVENOUS; SUBCUTANEOUS ONCE
Status: COMPLETED | OUTPATIENT
Start: 2023-02-16 | End: 2023-02-16

## 2023-02-16 RX ORDER — ONDANSETRON 2 MG/ML
INJECTION INTRAMUSCULAR; INTRAVENOUS
Status: DISCONTINUED | OUTPATIENT
Start: 2023-02-16 | End: 2023-02-16

## 2023-02-16 RX ORDER — CEFAZOLIN SODIUM 1 G/3ML
2 INJECTION, POWDER, FOR SOLUTION INTRAMUSCULAR; INTRAVENOUS
Status: COMPLETED | OUTPATIENT
Start: 2023-02-16 | End: 2023-02-16

## 2023-02-16 RX ORDER — ONDANSETRON 2 MG/ML
4 INJECTION INTRAMUSCULAR; INTRAVENOUS ONCE
Status: DISCONTINUED | OUTPATIENT
Start: 2023-02-16 | End: 2023-02-16 | Stop reason: HOSPADM

## 2023-02-16 RX ORDER — ROPIVACAINE HYDROCHLORIDE 5 MG/ML
INJECTION, SOLUTION EPIDURAL; INFILTRATION; PERINEURAL
Status: DISCONTINUED
Start: 2023-02-16 | End: 2023-02-16 | Stop reason: HOSPADM

## 2023-02-16 RX ORDER — SCOLOPAMINE TRANSDERMAL SYSTEM 1 MG/1
PATCH, EXTENDED RELEASE TRANSDERMAL
Status: COMPLETED
Start: 2023-02-16 | End: 2023-02-16

## 2023-02-16 RX ORDER — MIDAZOLAM HYDROCHLORIDE 1 MG/ML
INJECTION INTRAMUSCULAR; INTRAVENOUS
Status: DISCONTINUED
Start: 2023-02-16 | End: 2023-02-16 | Stop reason: HOSPADM

## 2023-02-16 RX ORDER — HYDROMORPHONE HYDROCHLORIDE 1 MG/ML
INJECTION, SOLUTION INTRAMUSCULAR; INTRAVENOUS; SUBCUTANEOUS
Status: DISCONTINUED
Start: 2023-02-16 | End: 2023-02-16 | Stop reason: HOSPADM

## 2023-02-16 RX ORDER — MIDAZOLAM HYDROCHLORIDE 5 MG/ML
5 INJECTION INTRAMUSCULAR; INTRAVENOUS ONCE
Status: DISCONTINUED | OUTPATIENT
Start: 2023-02-16 | End: 2023-02-16

## 2023-02-16 RX ORDER — DIPHENHYDRAMINE HYDROCHLORIDE 50 MG/ML
25 INJECTION INTRAMUSCULAR; INTRAVENOUS ONCE AS NEEDED
Status: DISCONTINUED | OUTPATIENT
Start: 2023-02-16 | End: 2023-02-16 | Stop reason: HOSPADM

## 2023-02-16 RX ORDER — KETOROLAC TROMETHAMINE 30 MG/ML
INJECTION, SOLUTION INTRAMUSCULAR; INTRAVENOUS
Status: DISCONTINUED | OUTPATIENT
Start: 2023-02-16 | End: 2023-02-16

## 2023-02-16 RX ORDER — OXYCODONE AND ACETAMINOPHEN 5; 325 MG/1; MG/1
2 TABLET ORAL ONCE
Status: DISCONTINUED | OUTPATIENT
Start: 2023-02-16 | End: 2023-02-16 | Stop reason: HOSPADM

## 2023-02-16 RX ORDER — BUPIVACAINE HYDROCHLORIDE 5 MG/ML
INJECTION, SOLUTION EPIDURAL; INTRACAUDAL
Status: DISCONTINUED | OUTPATIENT
Start: 2023-02-16 | End: 2023-02-16 | Stop reason: HOSPADM

## 2023-02-16 RX ORDER — PROPOFOL 10 MG/ML
INJECTION, EMULSION INTRAVENOUS
Status: DISCONTINUED | OUTPATIENT
Start: 2023-02-16 | End: 2023-02-16

## 2023-02-16 RX ORDER — MIDAZOLAM HYDROCHLORIDE 1 MG/ML
5 INJECTION INTRAMUSCULAR; INTRAVENOUS ONCE
Status: DISCONTINUED | OUTPATIENT
Start: 2023-02-16 | End: 2023-02-16 | Stop reason: HOSPADM

## 2023-02-16 RX ORDER — DEXAMETHASONE SODIUM PHOSPHATE 4 MG/ML
INJECTION, SOLUTION INTRA-ARTICULAR; INTRALESIONAL; INTRAMUSCULAR; INTRAVENOUS; SOFT TISSUE
Status: DISCONTINUED | OUTPATIENT
Start: 2023-02-16 | End: 2023-02-16

## 2023-02-16 RX ORDER — FENTANYL CITRATE 50 UG/ML
INJECTION, SOLUTION INTRAMUSCULAR; INTRAVENOUS
Status: DISCONTINUED | OUTPATIENT
Start: 2023-02-16 | End: 2023-02-16

## 2023-02-16 RX ORDER — MIDAZOLAM HYDROCHLORIDE 1 MG/ML
INJECTION INTRAMUSCULAR; INTRAVENOUS
Status: COMPLETED
Start: 2023-02-16 | End: 2023-02-16

## 2023-02-16 RX ORDER — GLYCOPYRROLATE 0.2 MG/ML
INJECTION INTRAMUSCULAR; INTRAVENOUS
Status: DISCONTINUED | OUTPATIENT
Start: 2023-02-16 | End: 2023-02-16

## 2023-02-16 RX ORDER — PROMETHAZINE HYDROCHLORIDE 25 MG/ML
INJECTION, SOLUTION INTRAMUSCULAR; INTRAVENOUS
Status: COMPLETED
Start: 2023-02-16 | End: 2023-02-16

## 2023-02-16 RX ORDER — MUPIROCIN 20 MG/G
OINTMENT TOPICAL
Status: DISCONTINUED | OUTPATIENT
Start: 2023-02-16 | End: 2023-02-16 | Stop reason: HOSPADM

## 2023-02-16 RX ORDER — PROCHLORPERAZINE EDISYLATE 5 MG/ML
5 INJECTION INTRAMUSCULAR; INTRAVENOUS ONCE AS NEEDED
Status: DISCONTINUED | OUTPATIENT
Start: 2023-02-16 | End: 2023-02-16 | Stop reason: HOSPADM

## 2023-02-16 RX ORDER — HYDROMORPHONE HYDROCHLORIDE 1 MG/ML
0.5 INJECTION, SOLUTION INTRAMUSCULAR; INTRAVENOUS; SUBCUTANEOUS EVERY 5 MIN PRN
Status: DISCONTINUED | OUTPATIENT
Start: 2023-02-16 | End: 2023-02-16 | Stop reason: HOSPADM

## 2023-02-16 RX ORDER — IPRATROPIUM BROMIDE AND ALBUTEROL SULFATE 2.5; .5 MG/3ML; MG/3ML
3 SOLUTION RESPIRATORY (INHALATION) ONCE AS NEEDED
Status: DISCONTINUED | OUTPATIENT
Start: 2023-02-16 | End: 2023-02-16 | Stop reason: HOSPADM

## 2023-02-16 RX ORDER — LABETALOL HYDROCHLORIDE 5 MG/ML
INJECTION, SOLUTION INTRAVENOUS
Status: DISCONTINUED | OUTPATIENT
Start: 2023-02-16 | End: 2023-02-16

## 2023-02-16 RX ORDER — SODIUM CHLORIDE, SODIUM LACTATE, POTASSIUM CHLORIDE, CALCIUM CHLORIDE 600; 310; 30; 20 MG/100ML; MG/100ML; MG/100ML; MG/100ML
INJECTION, SOLUTION INTRAVENOUS CONTINUOUS
Status: DISCONTINUED | OUTPATIENT
Start: 2023-02-16 | End: 2023-02-16 | Stop reason: HOSPADM

## 2023-02-16 RX ORDER — BUPIVACAINE HYDROCHLORIDE AND EPINEPHRINE 5; 5 MG/ML; UG/ML
INJECTION, SOLUTION EPIDURAL; INTRACAUDAL; PERINEURAL
Status: DISCONTINUED | OUTPATIENT
Start: 2023-02-16 | End: 2023-02-16 | Stop reason: HOSPADM

## 2023-02-16 RX ORDER — SCOLOPAMINE TRANSDERMAL SYSTEM 1 MG/1
PATCH, EXTENDED RELEASE TRANSDERMAL
Status: DISCONTINUED | OUTPATIENT
Start: 2023-02-16 | End: 2023-02-16

## 2023-02-16 RX ORDER — MEPERIDINE HYDROCHLORIDE 25 MG/ML
INJECTION INTRAMUSCULAR; INTRAVENOUS; SUBCUTANEOUS
Status: DISCONTINUED
Start: 2023-02-16 | End: 2023-02-16 | Stop reason: HOSPADM

## 2023-02-16 RX ORDER — LIDOCAINE HCL/PF 100 MG/5ML
SYRINGE (ML) INTRAVENOUS
Status: DISCONTINUED | OUTPATIENT
Start: 2023-02-16 | End: 2023-02-16

## 2023-02-16 RX ORDER — HYDROMORPHONE HYDROCHLORIDE 1 MG/ML
0.2 INJECTION, SOLUTION INTRAMUSCULAR; INTRAVENOUS; SUBCUTANEOUS EVERY 5 MIN PRN
Status: DISCONTINUED | OUTPATIENT
Start: 2023-02-16 | End: 2023-02-16 | Stop reason: HOSPADM

## 2023-02-16 RX ORDER — BUPIVACAINE HYDROCHLORIDE AND EPINEPHRINE 5; 5 MG/ML; UG/ML
INJECTION, SOLUTION EPIDURAL; INTRACAUDAL; PERINEURAL
Status: DISCONTINUED
Start: 2023-02-16 | End: 2023-02-16 | Stop reason: HOSPADM

## 2023-02-16 RX ORDER — LIDOCAINE HYDROCHLORIDE 10 MG/ML
1 INJECTION, SOLUTION EPIDURAL; INFILTRATION; INTRACAUDAL; PERINEURAL ONCE
Status: ACTIVE | OUTPATIENT
Start: 2023-02-16

## 2023-02-16 RX ORDER — BUPIVACAINE HYDROCHLORIDE 2.5 MG/ML
INJECTION, SOLUTION EPIDURAL; INFILTRATION; INTRACAUDAL
Status: DISCONTINUED
Start: 2023-02-16 | End: 2023-02-16 | Stop reason: WASHOUT

## 2023-02-16 RX ADMIN — FENTANYL CITRATE 50 MCG: 50 INJECTION, SOLUTION INTRAMUSCULAR; INTRAVENOUS at 07:02

## 2023-02-16 RX ADMIN — KETOROLAC TROMETHAMINE 30 MG: 30 INJECTION, SOLUTION INTRAMUSCULAR; INTRAVENOUS at 09:02

## 2023-02-16 RX ADMIN — KETAMINE HYDROCHLORIDE 25 MG: 50 INJECTION INTRAMUSCULAR; INTRAVENOUS at 09:02

## 2023-02-16 RX ADMIN — CEFAZOLIN 2 G: 330 INJECTION, POWDER, FOR SOLUTION INTRAMUSCULAR; INTRAVENOUS at 07:02

## 2023-02-16 RX ADMIN — PROMETHAZINE HYDROCHLORIDE 25 MG: 25 INJECTION INTRAMUSCULAR; INTRAVENOUS at 10:02

## 2023-02-16 RX ADMIN — ONDANSETRON 4 MG: 2 INJECTION INTRAMUSCULAR; INTRAVENOUS at 09:02

## 2023-02-16 RX ADMIN — MEPERIDINE HYDROCHLORIDE 12.5 MG: 25 INJECTION INTRAMUSCULAR; INTRAVENOUS; SUBCUTANEOUS at 10:02

## 2023-02-16 RX ADMIN — SODIUM CHLORIDE, POTASSIUM CHLORIDE, SODIUM LACTATE AND CALCIUM CHLORIDE: 600; 310; 30; 20 INJECTION, SOLUTION INTRAVENOUS at 06:02

## 2023-02-16 RX ADMIN — LIDOCAINE HYDROCHLORIDE 50 MG: 20 INJECTION INTRAVENOUS at 09:02

## 2023-02-16 RX ADMIN — FENTANYL CITRATE 25 MCG: 50 INJECTION, SOLUTION INTRAMUSCULAR; INTRAVENOUS at 08:02

## 2023-02-16 RX ADMIN — GLYCOPYRROLATE 0.2 MG: 0.2 INJECTION INTRAMUSCULAR; INTRAVENOUS at 07:02

## 2023-02-16 RX ADMIN — DEXAMETHASONE SODIUM PHOSPHATE 8 MG: 4 INJECTION, SOLUTION INTRA-ARTICULAR; INTRALESIONAL; INTRAMUSCULAR; INTRAVENOUS; SOFT TISSUE at 07:02

## 2023-02-16 RX ADMIN — PROPOFOL 150 MG: 10 INJECTION, EMULSION INTRAVENOUS at 07:02

## 2023-02-16 RX ADMIN — FENTANYL CITRATE 25 MCG: 50 INJECTION, SOLUTION INTRAMUSCULAR; INTRAVENOUS at 09:02

## 2023-02-16 RX ADMIN — MIDAZOLAM HYDROCHLORIDE 5 MG: 1 INJECTION, SOLUTION INTRAMUSCULAR; INTRAVENOUS at 06:02

## 2023-02-16 RX ADMIN — LIDOCAINE HYDROCHLORIDE 50 MG: 20 INJECTION INTRAVENOUS at 07:02

## 2023-02-16 RX ADMIN — SCOPOLAMINE 1 PATCH: 1 PATCH TRANSDERMAL at 07:02

## 2023-02-16 RX ADMIN — LABETALOL HYDROCHLORIDE 2.5 MG: 5 INJECTION INTRAVENOUS at 08:02

## 2023-02-16 RX ADMIN — HYDROMORPHONE HYDROCHLORIDE 0.5 MG: 1 INJECTION, SOLUTION INTRAMUSCULAR; INTRAVENOUS; SUBCUTANEOUS at 10:02

## 2023-02-16 RX ADMIN — KETAMINE HYDROCHLORIDE 25 MG: 50 INJECTION INTRAMUSCULAR; INTRAVENOUS at 07:02

## 2023-02-16 NOTE — PROGRESS NOTES
Faculty Attestation: I was present and scrubbed throughout the key elements of the procedure.  I agree with the resident's findings and description.    Jey Zambrano  Orthopaedic Surgery

## 2023-02-16 NOTE — TRANSFER OF CARE
Anesthesia Transfer of Care Note    Patient: Teresa Simpson    Procedure(s) Performed: Procedure(s) (LRB):  RECONSTRUCTION, LIGAMENT, MEDIAL PATELLOFEMORAL (Left)  ARTHROSCOPY, KNEE (Left)    Patient location: PACU    Anesthesia Type: general    Transport from OR: Transported from OR on room air with adequate spontaneous ventilation    Post pain: adequate analgesia    Post assessment: no apparent anesthetic complications and tolerated procedure well    Post vital signs: stable    Level of consciousness: sedated    Nausea/Vomiting: no nausea/vomiting    Complications: none    Transfer of care protocol was followedComments: Report to Marsha DE LEON      Last vitals:   Visit Vitals  BP (!) 140/72   Pulse 82   Temp 36.3 °C (97.3 °F) (Temporal)   Resp 20   Wt 93 kg (205 lb 0.4 oz)   LMP 02/01/2023   SpO2 100%   Breastfeeding No   BMI 32.11 kg/m²

## 2023-02-16 NOTE — OP NOTE
02/16/2023    PRIMARY SURGEON: Jey Zambrano MD    ASSISTANT: Shanika Graves NP was imperative to the critical parts of the surgical case.  This included the surgical approach and dissection, retraction, placement of hardware and implants, and closure.    PREOPERATIVE DIAGNOSIS:  1.  Left Patella maltracking  2. Patella cartilage loose body    POSTOPERATIVE DIAGNOSIS:  1.  Left Patella maltracking  2. Patella cartilage loose body  3. Superolateral suprapatellar plica    PROCEDURES:  1.  Diagnostic knee arthroscopy  2. Arthroscopic loose body removal  3. Superolateral suprapatellar plica excision  4.  Left Medial patellofemoral ligament allograft reconstruction    ANESTHESIA:  General anesthesia with femoral nerve block    BLOOD LOSS:  Less than 20 cc    DVT PROPHYLAXIS:  Aspirin twice daily for 2 weeks    OUTCOME:  Patient tolerated treatment/procedure well without complications and is now ready for discharge.    DISPOSITION: Home/SelfCare    FOLLOW UP: In clinic    INSTRUMENTATION:  Arthrex FiberTak all suture anchors x2, Arthrex 6 mm x 23 mm peek screw  Implant Name Type Inv. Item Serial No.  Lot No. LRB No. Used Action   dble strand semitendinosus      Left 1 Implanted   SCREW FASTTHREAD [id number] - [id number]  SCREW FASTTHREAD 6X20MM  ARTHREX [id number] Left 1 Implanted       PROCEDURE IN DETAIL:    Diagnostic knee arthroscopy    The patient was carefully place in a supine position. The operative lower extremity was placed in the padded leg angela. The non-operative lower extremity was placed in padded leg rest. The operative site was prepped and draped in the normal sterile fashion. A time out was performed to confirm correct operative extremity, allergies, and antibiotic infusion.   The knee joint was infused with 60cc of Marcaine used to insufflate. The supero-medial inflow portal is established. The hannah-medial and hannah-lateral portals are established. All portal sites established with  11-blade.    Through the hannah-lateral portal, we then sequentially visualized these areas of the knee for any pathology: medial comparment, postero-medial compartment, lateral compartment, postero-lateral compartment, intercondylar compartment, suprapatellar compartment, medial gutter, and lateral gutter.     The certified assistant provided critical assistance by holding instruments including the arthroscope to provide adequate visualization of the procedure, as well as assisting in wound closure.    At the end of the procedure the knee portal sites were closed with 3-0 Monocryl subcutaneously.  The patient tolerated the procedure well and taken to the recovery room in good condition.     Loose Body Removal    Loose bodies were encountered during the diagnostic scope. The loose bodies were removed with a shaver and grasper.    Plica Removal    A shaver was introduced into the anterolateral portal and while viewing from the superolateral portal, a plica was removed from the suprapatellar space.      Medial Patellofemoral Ligament Reconstruction    A 3cm incision is made over the medial side of the patella down to bone.  Both ends of the graft was sutured.  Then the  holes were drilled into the medial aspect of the patella on the superior and inferior ends.  Then these  holes were tapped with a 4.5 mm tap.  Then two all-suture anchors were placed equally over the medial aspect of the patella and then the graft was pulled to confirm good graft fixation.  Then my attention is turned to the femur. Using fluoroscopy I drilled guide wire in ideal spot on femur. Then I drilled with 6mm reamer. The hamstring tendon was shuttled just outside capsule to the femur. And then I placed the hamstring graft in the femur with interference screw with the knee at 60°. The knee cap stability was checked at full extension and was stable.

## 2023-02-16 NOTE — PROGRESS NOTES
Faculty Attestation: Teresa Simpson  was seen at [facility] in the Orthopaedic Clinic. Discussed with the resident at the time of the visit. History of Present Illness, Physical Exam, and Assessment and Plan reviewed. Treatment plan is reasonable and appropriate. Compliance with treatment recommendations is important. No procedure was performed.     Jey Zambrano MD  Orthopaedic Surgery

## 2023-02-16 NOTE — DISCHARGE INSTRUCTIONS
Keep follow up appointment  in [facility]-3rd Floor.    · Take pain medication as prescribed.    · Do not take Tylenol (acetaminophen) with your PERCOCET, since PERCOCET contains Tylenol as well. If you are experiencing severe pain even with your pain medications, please call the ORTHO clinic at [phone] to notify your doctor.    · Dressing: Leave clean and dry until follow up appointment.    · NO WEIGHT BEARING.    · Keep leg elevated on pillow.  Wearing knee immobilizer in locked extension @ all times.     · No driving or consuming alcohol for the next 24 hours or while taking narcotic pain medicine.    · May apply ice pack to surgical area for 20 minutes at a time 6-8 times per day.    · No driving or consuming alcohol for the next 24 hours or while taking narcotic pain medicine.    · May apply ice pack to surgical area for 20 minutes at a time 6-8 times per day.    · Notify MD of any moderate to severe pain unrelieved by pain medicine or for any signs of infection including fever above 100.4, excessive redness or swelling, yellow/green foul- smelling drainage, nausea or vomiting. Call clinic at: [phone]. After business hours, if you are unable to reach a doctor on call at [phone] or your concern is an emergency, call 911 or report to your nearest emergency room.    · Thanks for choosing [facility]! Have a smooth recovery!

## 2023-02-16 NOTE — ANESTHESIA POSTPROCEDURE EVALUATION
Anesthesia Post Evaluation    Patient: Teresa Simpson    Procedure(s) Performed: Procedure(s) (LRB):  RECONSTRUCTION, LIGAMENT, MEDIAL PATELLOFEMORAL (Left)  ARTHROSCOPY, KNEE (Left)    Final Anesthesia Type: general      Patient location during evaluation: PACU  Patient participation: Yes- Able to Participate  Level of consciousness: awake and responds to stimulation  Post-procedure vital signs: reviewed and stable  Pain management: adequate  Airway patency: patent    PONV status at discharge: No PONV  Anesthetic complications: no      Cardiovascular status: blood pressure returned to baseline  Respiratory status: unassisted  Hydration status: euvolemic  Follow-up not needed.          Vitals Value Taken Time   /84 02/16/23 1045   Temp 37.1 °C (98.8 °F) 02/16/23 1045   Pulse 96 02/16/23 1045   Resp 16 02/16/23 1045   SpO2 97 % 02/16/23 1045         Event Time   Out of Recovery 10:08:00         Pain/Olaf Score: Pain Rating Prior to Med Admin: 8 (2/16/2023 10:39 AM)  Olaf Score: 10 (2/16/2023 10:45 AM)

## 2023-02-16 NOTE — PLAN OF CARE
Transplantation record and feedback form sent to [email or web address] as required by fda regulations.

## 2023-02-16 NOTE — ANESTHESIA PROCEDURE NOTES
Intubation    Date/Time: 2/16/2023 7:14 AM  Performed by: Ozzy Locke CRNA  Authorized by: Ariana Rivers MD     Intubation:     Induction:  Intravenous    Intubated:  Postinduction    Mask Ventilation:  Easy mask    Attempts:  1    Attempted By:  CRNA    Difficult Airway Encountered?: No      Complications:  None    Airway Device:  Supraglottic airway/LMA    Airway Device Size:  4.0    Style/Cuff Inflation:  Uncuffed    Placement Verified By:  Capnometry    Complicating Factors:  None    Findings Post-Intubation:  BS equal bilateral and atraumatic/condition of teeth unchanged  Notes:      No leak @ 20 cm H2O

## 2023-02-16 NOTE — BRIEF OP NOTE
Orthopedic Surgery BriefOp Discharge Note    Teresa Simpson    [id number]      Date of Admit/Procedure: 2/16/2023    Date of Discharge: 2/16/23    Admission Condition: good    Discharged Condition: good    Admitting Physician: Jey Zambrano MD    Discharge Physician: same    Indication for Admission: surgery, pain control, observation    Pre-op Diagnosis: patella dislocation, left, MPFL rupture, loose chondral fragment, left knee       Post-op Diagnosis: same    Procedure(s):  Left knee MPFL reconstruction  Arthroscopic loose body removal, debridement left knee    Anesthesia: General    Surgeon(s) and Role:  Panel 1:     * Jey Zambrano MD - Primary     * Roney Martins MD - Resident: Surgeon Not Chief    Estimated Blood Loss: less than 50 mL    Quantatative Blood Loss: No Data Recorded           Drain:  none           Total IV Fluids: see anesthesia documentation           Specimens: * No specimens in log *           Implants: arthrex knotless anchors x2, bioabsorbable interference screw x1, gracillis allograft x1           Complications:  none    Findings: rupture of MPFL left knee, loose body left knee           Disposition: awakened from anesthesia, extubated and taken to the recovery room in a stable condition, having suffered no apparent untoward event.           Condition: doing well without problems      Technique: see full op note      Hospital Course: Patient presented to [facility] on day of scheduled surgery and underwent MPFL reconstruction, arthroscopic loose body removal left knee please see operative report for further detail. Patient did well postoperatively and was found to be fit for discharge on POD# 0.  Their pain was controlled.  The patient met all discharge criteria.  The patient was discharged home in stable condition. Patient was given paper prescriptions.  They were given a follow-up appointment in 2 weeks in clinic for a wound check and range of motion check.  All questions and concerns of the  patient were answered to their satisfaction.     Significant Diagnostic Studies: None applicable      Disposition: home with spouse     Patient Active Problem List    Diagnosis Date Noted    Loose body in knee, left 02/13/2023    Patellar instability of left knee 01/18/2023        Postop Discharge Instructions:  NWB LLE in hinged knee brace, locked in full extension until f/u, then gradually progress to wbat with crutches, and gradual increase in flexion with brace.   Multimodal pain control  Maintain dressing clean and dry until followup  Discharge home  Return to clinic 2 weeks postop for reevaluation      Discussed plan with patient and answered questions: Yes       Roney Martins

## 2023-02-22 RX ORDER — HYDROCODONE BITARTRATE AND ACETAMINOPHEN 10; 325 MG/1; MG/1
1 TABLET ORAL EVERY 6 HOURS PRN
Qty: 20 TABLET | Refills: 0 | Status: SHIPPED | OUTPATIENT
Start: 2023-02-22

## 2023-02-22 NOTE — PROGRESS NOTES
Orthopedic surgery Clinic note    Spoke with the patient regarding her postoperative pain regimen.  She states that she is having a significant amount of breakthrough pain and is following the multimodal pain regimen as directed.  She denies fevers, chills, wound complications.  She has been compliant with weight-bearing restrictions and brace wear.    -informed her that we can refill a small amount of hydrocodone for breakthrough pain, we will leave the prescription with our  for her  to   -maintain follow-up appointment next week    Roney Martins MD  \[facility]\"" Orthopedic Surgery

## 2023-02-24 VITALS
RESPIRATION RATE: 18 BRPM | DIASTOLIC BLOOD PRESSURE: 70 MMHG | SYSTOLIC BLOOD PRESSURE: 136 MMHG | BODY MASS INDEX: 32.11 KG/M2 | TEMPERATURE: 98 F | OXYGEN SATURATION: 99 % | HEART RATE: 80 BPM | WEIGHT: 205 LBS

## 2023-03-01 ENCOUNTER — HOSPITAL ENCOUNTER (OUTPATIENT)
Dept: RADIOLOGY | Facility: HOSPITAL | Age: 33
Discharge: HOME | End: 2023-03-01
Attending: STUDENT IN AN ORGANIZED HEALTH CARE EDUCATION/TRAINING PROGRAM
Payer: MEDICAID

## 2023-03-01 ENCOUNTER — OFFICE VISIT (OUTPATIENT)
Dept: ORTHOPEDICS | Facility: CLINIC | Age: 33
End: 2023-03-01
Payer: MEDICAID

## 2023-03-01 VITALS
WEIGHT: 205 LBS | RESPIRATION RATE: 20 BRPM | OXYGEN SATURATION: 96 % | SYSTOLIC BLOOD PRESSURE: 138 MMHG | DIASTOLIC BLOOD PRESSURE: 82 MMHG | HEIGHT: 67 IN | BODY MASS INDEX: 32.18 KG/M2 | HEART RATE: 105 BPM

## 2023-03-01 DIAGNOSIS — M25.562 ACUTE PAIN OF LEFT KNEE: ICD-10-CM

## 2023-03-01 DIAGNOSIS — Z09 POSTOP CHECK: ICD-10-CM

## 2023-03-01 DIAGNOSIS — M25.562 ACUTE PAIN OF LEFT KNEE: Primary | ICD-10-CM

## 2023-03-01 PROCEDURE — 99024 POSTOP FOLLOW-UP VISIT: CPT | Mod: ,,, | Performed by: SPECIALIST

## 2023-03-01 PROCEDURE — 99214 OFFICE O/P EST MOD 30 MIN: CPT | Mod: PBBFAC

## 2023-03-01 PROCEDURE — 3079F DIAST BP 80-89 MM HG: CPT | Mod: CPTII,,, | Performed by: SPECIALIST

## 2023-03-01 PROCEDURE — 1159F PR MEDICATION LIST DOCUMENTED IN MEDICAL RECORD: ICD-10-PCS | Mod: CPTII,,, | Performed by: SPECIALIST

## 2023-03-01 PROCEDURE — 73564 X-RAY EXAM KNEE 4 OR MORE: CPT | Mod: TC,LT

## 2023-03-01 PROCEDURE — 99024 PR POST-OP FOLLOW-UP VISIT: ICD-10-PCS | Mod: ,,, | Performed by: SPECIALIST

## 2023-03-01 PROCEDURE — 3008F BODY MASS INDEX DOCD: CPT | Mod: CPTII,,, | Performed by: SPECIALIST

## 2023-03-01 PROCEDURE — 3075F PR MOST RECENT SYSTOLIC BLOOD PRESS GE 130-139MM HG: ICD-10-PCS | Mod: CPTII,,, | Performed by: SPECIALIST

## 2023-03-01 PROCEDURE — 3079F PR MOST RECENT DIASTOLIC BLOOD PRESSURE 80-89 MM HG: ICD-10-PCS | Mod: CPTII,,, | Performed by: SPECIALIST

## 2023-03-01 PROCEDURE — 3008F PR BODY MASS INDEX (BMI) DOCUMENTED: ICD-10-PCS | Mod: CPTII,,, | Performed by: SPECIALIST

## 2023-03-01 PROCEDURE — 3075F SYST BP GE 130 - 139MM HG: CPT | Mod: CPTII,,, | Performed by: SPECIALIST

## 2023-03-01 PROCEDURE — 1159F MED LIST DOCD IN RCRD: CPT | Mod: CPTII,,, | Performed by: SPECIALIST

## 2023-03-01 RX ORDER — TRAMADOL HYDROCHLORIDE 50 MG/1
50 TABLET ORAL EVERY 6 HOURS
Qty: 28 TABLET | Refills: 0 | Status: SHIPPED | OUTPATIENT
Start: 2023-03-01 | End: 2023-03-08

## 2023-03-01 NOTE — PROGRESS NOTES
[facility] Orthopaedic Surgery Clinic Note    In brief, Teresa Simpson is a 32 y.o. female who sustained a left patellar dislocation while dancing in early December, now status post MPFL reconstruction of the left knee on 02/16/2023 with Dr. Zambrano.      Interval history:  Patient has done well since surgery, she does have some expected postoperative pain.  She returns today for her initial postoperative follow-up.  She denies fevers, chills.  She has been compliant with her hinged knee brace.      PMH:   Past Medical History:   Diagnosis Date    Known health problems: none        PSH:   Past Surgical History:   Procedure Laterality Date    ARTHROSCOPY OF KNEE Left 2/16/2023    Procedure: ARTHROSCOPY, KNEE;  Surgeon: Jey Zambrano MD;  Location: [facility];  Service: Orthopedics;  Laterality: Left;    EXPLORATORY LAPAROTOMY  06/02/2014    w/LSO    RECONSTRUCTION OF MEDIAL PATELLOFEMORAL LIGAMENT Left 2/16/2023    Procedure: RECONSTRUCTION, LIGAMENT, MEDIAL PATELLOFEMORAL;  Surgeon: Jey Zambrano MD;  Location: [facility];  Service: Orthopedics;  Laterality: Left;  regular bed, supine, lateral post, small sterile triangle  c-arm,gracilis autograft,arthrex       SH:   Social History     Socioeconomic History    Marital status:    Tobacco Use    Smoking status: Never    Smokeless tobacco: Never   Substance and Sexual Activity    Alcohol use: Yes     Comment: occ    Drug use: Never       FH: History reviewed. No pertinent family history.    Allergies: Review of patient's allergies indicates:  No Known Allergies    ROS:  Negative except for above    Physical Exam:  Vitals:    03/01/23 1014   BP: 138/82   Pulse: 105   Resp: 20       General: NAD  Cardio: RRR by peripheral pulse  Pulm: Normal WOB on room air, symmetric chest rise    MSK:  Left lower extremity   All surgical incisions over the knee are healing appropriately without any evidence of infection  TTP over medial femoral condyle and medial proximal patella  pole  Range of motion not tested  Sensation intact to light touch SA/S/SP/DP/T   Motor intact quad/hamstrings/TA/EHL/FHL  Quadriceps 4/5 strength  DP palpable, skin warm and well-perfused    Imaging:   MRI of the left knee demonstrates full thickness MPFL tear from medial femoral condyle, as well as a small osteochondral defect from the medial patella facet with loose intraarticular body    Assessment:  32-year-old female with lateral dislocation of the left patella, sustained while dancing in December. Currently feels unstable, has been wearing hinged knee brace. Failed home exercise program. Has full ROM. Interested in surgery.     -all wounds are healing appropriately  - prescribed physical therapy to begin working on advancing her range of motion of the knee, we will start with 0-30 degrees and advance 30° every 2 weeks with a goal of 120° at 6 weeks  -prescribed tramadol, instructed her that she should start stepping down her pain medicines and take anti-inflammatory medicines over the counter as needed for pain and tramadol only for breakthrough  -maintain hinged knee brace while ambulatory      Roney Martins MD

## 2023-03-06 NOTE — PROGRESS NOTES
Faculty Attestation: Teresa Simpson  was seen at [facility] in the Orthopaedic Clinic. Discussed with the resident at the time of the visit. History of Present Illness, Physical Exam, and Assessment and Plan reviewed. Treatment plan is reasonable and appropriate. Compliance with treatment recommendations is important. Discussed with the resident at the time of the visit.  No procedure was performed.     Kalyan Zuniga MD MD  Orthopaedic Surgery

## 2023-04-03 ENCOUNTER — OFFICE VISIT (OUTPATIENT)
Dept: ORTHOPEDICS | Facility: CLINIC | Age: 33
End: 2023-04-03
Payer: MEDICAID

## 2023-04-03 VITALS
DIASTOLIC BLOOD PRESSURE: 80 MMHG | WEIGHT: 205 LBS | SYSTOLIC BLOOD PRESSURE: 121 MMHG | OXYGEN SATURATION: 96 % | BODY MASS INDEX: 32.18 KG/M2 | RESPIRATION RATE: 20 BRPM | HEART RATE: 78 BPM | HEIGHT: 67 IN

## 2023-04-03 DIAGNOSIS — M25.362 PATELLAR INSTABILITY OF LEFT KNEE: Primary | ICD-10-CM

## 2023-04-03 PROCEDURE — 3008F BODY MASS INDEX DOCD: CPT | Mod: CPTII,,, | Performed by: ORTHOPAEDIC SURGERY

## 2023-04-03 PROCEDURE — 1159F MED LIST DOCD IN RCRD: CPT | Mod: CPTII,,, | Performed by: ORTHOPAEDIC SURGERY

## 2023-04-03 PROCEDURE — 99213 OFFICE O/P EST LOW 20 MIN: CPT | Mod: PBBFAC

## 2023-04-03 PROCEDURE — 1159F PR MEDICATION LIST DOCUMENTED IN MEDICAL RECORD: ICD-10-PCS | Mod: CPTII,,, | Performed by: ORTHOPAEDIC SURGERY

## 2023-04-03 PROCEDURE — 3079F DIAST BP 80-89 MM HG: CPT | Mod: CPTII,,, | Performed by: ORTHOPAEDIC SURGERY

## 2023-04-03 PROCEDURE — 99024 POSTOP FOLLOW-UP VISIT: CPT | Mod: ,,, | Performed by: ORTHOPAEDIC SURGERY

## 2023-04-03 PROCEDURE — 3079F PR MOST RECENT DIASTOLIC BLOOD PRESSURE 80-89 MM HG: ICD-10-PCS | Mod: CPTII,,, | Performed by: ORTHOPAEDIC SURGERY

## 2023-04-03 PROCEDURE — 99024 PR POST-OP FOLLOW-UP VISIT: ICD-10-PCS | Mod: ,,, | Performed by: ORTHOPAEDIC SURGERY

## 2023-04-03 PROCEDURE — 3008F PR BODY MASS INDEX (BMI) DOCUMENTED: ICD-10-PCS | Mod: CPTII,,, | Performed by: ORTHOPAEDIC SURGERY

## 2023-04-03 PROCEDURE — 3074F PR MOST RECENT SYSTOLIC BLOOD PRESSURE < 130 MM HG: ICD-10-PCS | Mod: CPTII,,, | Performed by: ORTHOPAEDIC SURGERY

## 2023-04-03 PROCEDURE — 3074F SYST BP LT 130 MM HG: CPT | Mod: CPTII,,, | Performed by: ORTHOPAEDIC SURGERY

## 2023-04-03 RX ORDER — IBUPROFEN 800 MG/1
800 TABLET ORAL 3 TIMES DAILY
COMMUNITY

## 2023-04-03 NOTE — PROGRESS NOTES
[facility] Orthopaedic Surgery Clinic Note    In brief, Teresa Simpson is a 32 y.o. female who sustained a left patellar dislocation while dancing in early December, now status post MPFL reconstruction of the left knee on 02/16/2023 with Dr. Zambrano.    Interval history:  Patient continues to do well after surgery.  Has some pain around the medial incision site.  Therapist was massaging this incision and felt a little pop which was painful for her she has done better since then.  She denies any local or systemic signs of infection.  Has been compliant with hinged knee brace which is set to 90 today.      PMH:   Past Medical History:   Diagnosis Date    Known health problems: none        PSH:   Past Surgical History:   Procedure Laterality Date    ARTHROSCOPY OF KNEE Left 2/16/2023    Procedure: ARTHROSCOPY, KNEE;  Surgeon: Jey Zambrano MD;  Location: [facility];  Service: Orthopedics;  Laterality: Left;    EXPLORATORY LAPAROTOMY  06/02/2014    w/LSO    RECONSTRUCTION OF MEDIAL PATELLOFEMORAL LIGAMENT Left 2/16/2023    Procedure: RECONSTRUCTION, LIGAMENT, MEDIAL PATELLOFEMORAL;  Surgeon: Jey Zambrano MD;  Location: [facility];  Service: Orthopedics;  Laterality: Left;  regular bed, supine, lateral post, small sterile triangle  c-arm,gracilis autograft,arthrex       SH:   Social History     Socioeconomic History    Marital status:    Tobacco Use    Smoking status: Never    Smokeless tobacco: Never   Substance and Sexual Activity    Alcohol use: Yes     Comment: occ    Drug use: Never       FH: History reviewed. No pertinent family history.    Allergies: Review of patient's allergies indicates:  No Known Allergies    ROS:  Negative except for above    Physical Exam:  Vitals:    04/03/23 1034   BP: 121/80   Pulse: 78   Resp: 20       General: NAD  Cardio: RRR by peripheral pulse  Pulm: Normal WOB on room air, symmetric chest rise    MSK:  Left lower extremity   All surgical incisions over the knee are healing  appropriately without any evidence of infection  Mild TTP over medial femoral condyle and medial proximal patella pole  Range of motion 0-90  Sensation intact to light touch SA/S/SP/DP/T   Motor intact quad/hamstrings/TA/EHL/FHL  Quadriceps 4+/5 strength  DP palpable, skin warm and well-perfused    Imaging:   No new    Assessment:  32-year-old female with lateral dislocation of the left patella status post MPFL reconstruction 02/16/2023    She is now 6 weeks out from surgery.  May discontinue the brace.  Full weight-bearing without restrictions.  Needs to start working on progressive strengthening and mobility with therapy.  Still somewhat stiff.  Ice, over-the-counter anti-inflammatories for pain.  We will see back in 6 weeks for range-of-motion check.    Blade Vallejo MD

## 2023-04-03 NOTE — PROGRESS NOTES
Faculty Attestation: Teresa Simpson  was seen at [facility] in the Orthopaedic Clinic. Discussed with the resident at the time of the visit. History of Present Illness, Physical Exam, and Assessment and Plan reviewed. Treatment plan is reasonable and appropriate. Compliance with treatment recommendations is important. No procedure was performed.     Alvarado Lazaro MD  Orthopaedic Surgery

## 2023-05-17 ENCOUNTER — OFFICE VISIT (OUTPATIENT)
Dept: ORTHOPEDICS | Facility: CLINIC | Age: 33
End: 2023-05-17
Payer: MEDICAID

## 2023-05-17 VITALS — WEIGHT: 201 LBS | HEIGHT: 67 IN | BODY MASS INDEX: 31.55 KG/M2

## 2023-05-17 DIAGNOSIS — M25.362 PATELLAR INSTABILITY OF LEFT KNEE: Primary | ICD-10-CM

## 2023-05-17 PROCEDURE — 3008F PR BODY MASS INDEX (BMI) DOCUMENTED: ICD-10-PCS | Mod: CPTII,,, | Performed by: ORTHOPAEDIC SURGERY

## 2023-05-17 PROCEDURE — 99499 NO LOS: ICD-10-PCS | Mod: S$PBB,ICN,, | Performed by: ORTHOPAEDIC SURGERY

## 2023-05-17 PROCEDURE — 3008F BODY MASS INDEX DOCD: CPT | Mod: CPTII,,, | Performed by: ORTHOPAEDIC SURGERY

## 2023-05-17 PROCEDURE — 99499 UNLISTED E&M SERVICE: CPT | Mod: S$PBB,ICN,, | Performed by: ORTHOPAEDIC SURGERY

## 2023-05-17 PROCEDURE — 1159F MED LIST DOCD IN RCRD: CPT | Mod: CPTII,,, | Performed by: ORTHOPAEDIC SURGERY

## 2023-05-17 PROCEDURE — 1159F PR MEDICATION LIST DOCUMENTED IN MEDICAL RECORD: ICD-10-PCS | Mod: CPTII,,, | Performed by: ORTHOPAEDIC SURGERY

## 2023-05-17 PROCEDURE — 99213 OFFICE O/P EST LOW 20 MIN: CPT | Mod: PBBFAC

## 2023-05-17 NOTE — PROGRESS NOTES
Faculty Attestation: Teresa Simpson  was seen at [facility] in the Orthopaedic Clinic. History of Present Illness, Physical Exam, and Assessment and Plan reviewed. Treatment plan is reasonable and appropriate. Compliance with treatment recommendations is important. Discussed with the resident at the time of the visit.  No procedure was performed.     David Marshall Jr, MD  Orthopaedic Surgery

## 2023-05-17 NOTE — PROGRESS NOTES
[facility] Orthopaedic Surgery Clinic Note    In brief, Teresa Simpson is a 32 y.o. female who sustained a left patellar dislocation while dancing in early December, now status post MPFL reconstruction of the left knee on 02/16/2023 with Dr. Zambrano.    Interval history:  Patient now 3 months out from surgery.  Doing very well.  Has been working diligently with therapy and has regained full range of movement motion and strength.  Has occasional pains which is well treated with over-the-counter anti-inflammatory medication.  No interval complications.  Happy with result.      PMH:   Past Medical History:   Diagnosis Date    Known health problems: none        PSH:   Past Surgical History:   Procedure Laterality Date    ARTHROSCOPY OF KNEE Left 2/16/2023    Procedure: ARTHROSCOPY, KNEE;  Surgeon: Jey Zambrano MD;  Location: [facility];  Service: Orthopedics;  Laterality: Left;    EXPLORATORY LAPAROTOMY  06/02/2014    w/LSO    RECONSTRUCTION OF MEDIAL PATELLOFEMORAL LIGAMENT Left 2/16/2023    Procedure: RECONSTRUCTION, LIGAMENT, MEDIAL PATELLOFEMORAL;  Surgeon: Jey Zambrano MD;  Location: [facility];  Service: Orthopedics;  Laterality: Left;  regular bed, supine, lateral post, small sterile triangle  c-arm,gracilis autograft,arthrex       SH:   Social History     Socioeconomic History    Marital status:    Tobacco Use    Smoking status: Never    Smokeless tobacco: Never   Substance and Sexual Activity    Alcohol use: Yes     Comment: occ    Drug use: Never       FH: History reviewed. No pertinent family history.    Allergies: Review of patient's allergies indicates:  No Known Allergies    ROS:  Negative except for above    Physical Exam:  There were no vitals filed for this visit.      General: NAD  Cardio: RRR by peripheral pulse  Pulm: Normal WOB on room air, symmetric chest rise    MSK:  Left lower extremity   All surgical incisions over the knee are healing appropriately without any evidence of infection  No  tenderness to palpation  Range of motion 0-120  Sensation intact to light touch SA/S/SP/DP/T   Motor intact quad/hamstrings/TA/EHL/FHL  Quadriceps 5/5 strength  DP palpable, skin warm and well-perfused    Imaging:   No new    Assessment:  32-year-old female with lateral dislocation of the left patella status post MPFL reconstruction 02/16/2023    Doing well 3 months out from surgery.  Has made good progress with therapy.  Can continue therapy as needed, home exercises.  Over-the-counter medication for pain.  No restrictions at this point.  Follow up p.r.n..    Blade Vallejo MD

## 2023-06-05 PROBLEM — Z09 POSTOP CHECK: Status: RESOLVED | Noted: 2023-03-01 | Resolved: 2023-06-05

## (undated) DEVICE — SPONGE LAP STRL 18X18IN

## (undated) DEVICE — STOCKINETTE IMPERVIOUS LARGE

## (undated) DEVICE — POSITIONER HEAD ADULT

## (undated) DEVICE — GLOVE PROTEXIS HYDROGEL SZ9

## (undated) DEVICE — PAD ABD 8X10 STERILE

## (undated) DEVICE — ELECTRODE PATIENT RETURN DISP

## (undated) DEVICE — SUPPORT ULNA NERVE PROTECTOR

## (undated) DEVICE — FIBERTAPE COLLAGEN COATED

## (undated) DEVICE — SYS IRRISEPT 450ML0.05% CHG

## (undated) DEVICE — HANDLE DEVON RIGID OR LIGHT

## (undated) DEVICE — SOL NACL IRR 1000ML BTL

## (undated) DEVICE — GOWN X-LG STERILE BACK

## (undated) DEVICE — BLADE COOLCUT EXCALIBER 4X13

## (undated) DEVICE — IMMOB KNEE UNIV TRI PANEL 19IN

## (undated) DEVICE — TUBE SUCTION MEDI-VAC STERILE

## (undated) DEVICE — GOWN POLY REINF X-LONG 2XL

## (undated) DEVICE — DRAPE C-ARMOR EQUIPMENT COVER

## (undated) DEVICE — DRAPE T EXTRM SURG 121X128X90

## (undated) DEVICE — DRAPE FULL SHEET 70X100IN

## (undated) DEVICE — DRAPE STERI U-SHAPED 47X51IN

## (undated) DEVICE — GLOVE PROTEXIS BLUE LATEX 7.5

## (undated) DEVICE — GLOVE PROTEXIS NEOPRENE 7.5

## (undated) DEVICE — BENZOIN TINCTURE CAPSULET

## (undated) DEVICE — NDL SAFETY 21G X 1 1/2 ECLPSE

## (undated) DEVICE — SUT VICRYL+ 2-0 SH 18IN

## (undated) DEVICE — SET CYSTO IRR DRP CHMBR 84IN

## (undated) DEVICE — PAD PREP CUFFED NS 24X48IN

## (undated) DEVICE — KIT FIBERTAK DISP ANCHOR 2.6

## (undated) DEVICE — FIBERTAPE 54 #2

## (undated) DEVICE — BLADE SURG STAINLESS STEEL #10

## (undated) DEVICE — SUT MONOCRYL 3-0 PS-2 UND

## (undated) DEVICE — CLOSURE SKIN STERI STRIP 1/2X4

## (undated) DEVICE — GLOVE PROTEXIS BLUE LATEX 9

## (undated) DEVICE — PACK SURGICAL KNEE SCOPE

## (undated) DEVICE — DRAPE U-DRAPE ADHESIVE 60X60IN

## (undated) DEVICE — SAWBLADE TRANS TIB ACL

## (undated) DEVICE — COVER EQUIPMENT 36X25

## (undated) DEVICE — NDL 26.5 TAPR CRV XLOOP

## (undated) DEVICE — BLADE SURG STAINLESS STEEL #11

## (undated) DEVICE — GLOVE PROTEXIS HYDROGEL SZ7.5

## (undated) DEVICE — GAUZE SPONGE 4X4 12PLY

## (undated) DEVICE — KIT SURGICAL TURNOVER

## (undated) DEVICE — GLOVE PROTEXIS HYDROGEL SZ6.5

## (undated) DEVICE — CUBE COLD THERAPY POLAR PAD

## (undated) DEVICE — SOL NACL IRR 3000ML

## (undated) DEVICE — SUT MCRYL PLUS 4-0 PS2 27IN

## (undated) DEVICE — APPLICATOR CHLORAPREP ORN 26ML

## (undated) DEVICE — ADHESIVE DERMABOND ADVANCED

## (undated) DEVICE — PENCIL ELECSURG ROCKER 15FT

## (undated) DEVICE — CUFF ATS 2 PORT SNGL BLDR 34IN

## (undated) DEVICE — Device

## (undated) DEVICE — COVER TABLE HVY DTY 60X90IN

## (undated) DEVICE — NDL SPINAL X-LONG 18GA

## (undated) DEVICE — SUT VICRYL 2-0 8-18 CP-2

## (undated) DEVICE — PAD ABDOMINAL STERILE 8X10IN

## (undated) DEVICE — GLOVE 6.5 PROTEXIS PI BLUE

## (undated) DEVICE — SUT JJ41G O VICRYL

## (undated) DEVICE — BLADE SURG STAINLESS STEEL #15

## (undated) DEVICE — COVER MAYO STAND REINFRCD 30

## (undated) DEVICE — GLOVE PROTEXIS BLUE LATEX 8

## (undated) DEVICE — KIT POSITIONING KNEE

## (undated) DEVICE — SUT FIBERLINK TAPE BLU WHT 1.3

## (undated) DEVICE — PADDING WYTEX UNDRCST 6INX4YD

## (undated) DEVICE — SYR 50CC LL